# Patient Record
(demographics unavailable — no encounter records)

---

## 2025-03-03 NOTE — PHYSICAL EXAM
[No Acute Distress] : no acute distress [Well Developed] : well developed [Normal Sclera/Conjunctiva] : normal sclera/conjunctiva [EOMI] : extraocular movements intact [No Respiratory Distress] : no respiratory distress  [No Accessory Muscle Use] : no accessory muscle use [Clear to Auscultation] : lungs were clear to auscultation bilaterally [Normal Rate] : normal rate  [Regular Rhythm] : with a regular rhythm [Normal S1, S2] : normal S1 and S2 [No Murmur] : no murmur heard [No Edema] : there was no peripheral edema [Normal Appearance] : normal in appearance [No Masses] : no palpable masses [No Nipple Discharge] : no nipple discharge [No Axillary Lymphadenopathy] : no axillary lymphadenopathy [Soft] : abdomen soft [Non Tender] : non-tender [Non-distended] : non-distended [Normal Bowel Sounds] : normal bowel sounds [de-identified] : chaperone: nurse: Chelsea

## 2025-03-03 NOTE — INTERPRETER SERVICES
[Language Line ] : provided by [employer]   [Interpreters_IDNumber] : [id number] [Interpreters_FullName] : Galo

## 2025-03-03 NOTE — HISTORY OF PRESENT ILLNESS
[FreeTextEntry8] : 38 yo F who is here for b/l breast pain.  2 months, constant clear discharge from left breast never did mammogram/ US breast   LMP 2/15/2025, regular period  2-3 pads each day, 3 to 4 days   Denie fever or chills

## 2025-03-28 NOTE — PLAN
[FreeTextEntry1] : I   #Breast Pain likely 2/2 hormonal changes. US and mammo unremarkable. Repeat in 1 yr.  -Advised NSAIDs use prn for pain -Rec soy products -f/u in 3 months, or earlier if worsening breast pain or breast changes  *Case d/w Dr. Kaur

## 2025-03-28 NOTE — HISTORY OF PRESENT ILLNESS
[de-identified] : 39 yo F who presents for f/u b/l breast pain. Pt continues to have b/l lower breast pain, L>R, [id number]. Denies discharge from nipples or breast changes. Denies breast trauma. Pain is worse when period is coming. Denies FHx of breast cancer. Last visit 3/3/25, mammo and US ordered which revealed no evidence of malignancy, no discrete solid or cystic lesion is noted upon sonographic evaluation of the right and left breast, and rec mammo and US in 1 yr. Denies use of medications or OCPs- s/p tubal ligation.

## 2025-03-28 NOTE — PHYSICAL EXAM
[Normal] : no respiratory distress, lungs were clear to auscultation bilaterally and no accessory muscle use [Normal Rate] : normal rate  [Regular Rhythm] : with a regular rhythm [Soft] : abdomen soft [Non Tender] : non-tender [Non-distended] : non-distended [Normal Affect] : the affect was normal [Normal Insight/Judgement] : insight and judgment were intact [Declined Breast Exam] : declined breast exam  [de-identified] : (exam earlier this month unremarkable)

## 2025-03-28 NOTE — PHYSICAL EXAM
[Normal] : no respiratory distress, lungs were clear to auscultation bilaterally and no accessory muscle use [Normal Rate] : normal rate  [Regular Rhythm] : with a regular rhythm [Soft] : abdomen soft [Non Tender] : non-tender [Non-distended] : non-distended [Normal Affect] : the affect was normal [Normal Insight/Judgement] : insight and judgment were intact [Declined Breast Exam] : declined breast exam  [de-identified] : (exam earlier this month unremarkable)

## 2025-03-28 NOTE — HISTORY OF PRESENT ILLNESS
[de-identified] : 39 yo F who presents for f/u b/l breast pain. Pt continues to have b/l lower breast pain, L>R, [id number]. Denies discharge from nipples or breast changes. Denies breast trauma. Pain is worse when period is coming. Denies FHx of breast cancer. Last visit 3/3/25, mammo and US ordered which revealed no evidence of malignancy, no discrete solid or cystic lesion is noted upon sonographic evaluation of the right and left breast, and rec mammo and US in 1 yr. Denies use of medications or OCPs- s/p tubal ligation.

## 2025-03-28 NOTE — PLAN
[FreeTextEntry1] : I   #Breast Pain likely 2/2 hormonal changes. US and mammo unremarkable. Repeat in 1 yr.  -Advised NSAIDs use prn for pain -Rec soy products -f/u in 3 months, or earlier if worsening breast pain or breast changes  *Case d/w Dr. Kaur  Unknown if ever smoked